# Patient Record
Sex: MALE | Race: WHITE
[De-identification: names, ages, dates, MRNs, and addresses within clinical notes are randomized per-mention and may not be internally consistent; named-entity substitution may affect disease eponyms.]

---

## 2022-05-23 ENCOUNTER — HOSPITAL ENCOUNTER (EMERGENCY)
Dept: HOSPITAL 46 - ED | Age: 27
Discharge: HOME | End: 2022-05-23
Payer: COMMERCIAL

## 2022-05-23 VITALS — WEIGHT: 170 LBS | BODY MASS INDEX: 22.53 KG/M2 | HEIGHT: 73 IN

## 2022-05-23 DIAGNOSIS — I49.3: Primary | ICD-10-CM

## 2022-05-24 NOTE — EKG
[facility]
                                    [address] [facility]
                                  [place]  [address]
_________________________________________________________________________________________
                                                                 Signed   
 
 
Sinus rhythm with frequent premature ventricular complexes and premature atrial
complexes
Otherwise normal ECG
No previous ECGs available
Confirmed by RM HERRON MD ([id number]) on 5/24/2022 1:39:48 PM
 
 
 
 
 
 
 
 
 
 
 
 
 
 
 
 
 
 
 
 
 
 
 
 
 
 
 
 
 
 
 
 
 
 
 
 
 
 
 
 
    Electronically Signed By: RM HERRON MD  05/24/22 1340
_________________________________________________________________________________________
PATIENT NAME:     BRUTON JENNER,BRADY                   
MEDICAL RECORD #: [id number]                     Electrocardiogram             
          ACCT #: [id number]  
DATE OF BIRTH:   [birth year]                                       
PHYSICIAN:   RM HERRON MD           REPORT #: [id number]
REPORT IS CONFIDENTIAL AND NOT TO BE RELEASED WITHOUT AUTHORIZATION